# Patient Record
Sex: FEMALE | Race: WHITE | NOT HISPANIC OR LATINO | URBAN - METROPOLITAN AREA
[De-identification: names, ages, dates, MRNs, and addresses within clinical notes are randomized per-mention and may not be internally consistent; named-entity substitution may affect disease eponyms.]

---

## 2023-04-19 ENCOUNTER — OFFICE VISIT (OUTPATIENT)
Dept: URBAN - METROPOLITAN AREA CLINIC 17 | Facility: CLINIC | Age: 73
End: 2023-04-19
Payer: COMMERCIAL

## 2023-04-19 DIAGNOSIS — H40.033 ANATOMICAL NARROW ANGLE, BILATERAL: Primary | ICD-10-CM

## 2023-04-19 DIAGNOSIS — H35.722 SEROUS DETACHMENT OF RETINAL PIGMENT EPITHELIUM, LEFT EYE: ICD-10-CM

## 2023-04-19 DIAGNOSIS — H25.13 AGE-RELATED NUCLEAR CATARACT, BILATERAL: ICD-10-CM

## 2023-04-19 PROCEDURE — 99204 OFFICE O/P NEW MOD 45 MIN: CPT | Performed by: OPTOMETRIST

## 2023-04-19 PROCEDURE — 92134 CPTRZ OPH DX IMG PST SGM RTA: CPT | Performed by: OPTOMETRIST

## 2023-04-19 ASSESSMENT — INTRAOCULAR PRESSURE
OD: 19
OS: 15

## 2023-04-19 NOTE — IMPRESSION/PLAN
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 Plan: Ordered and reviewed MAC OCT w/ patient. Discussed diagnosis in detail with patient. Advised patient of condition. No treatment is required at this time. Will continue to observe condition and or symptoms. Advised patient to call with any decreases in vision.

## 2023-04-19 NOTE — IMPRESSION/PLAN
Impression: Anatomical narrow angle, bilateral: H40.033. Plan: Discussed diagnosis and treatment options in detail with patient. Continue Latanoprost QHS OS only. Will refer to Dr. Shravan Ross for [address]. Pt wishes to proceed.

## 2023-09-13 ENCOUNTER — OFFICE VISIT (OUTPATIENT)
Dept: URBAN - METROPOLITAN AREA CLINIC 17 | Facility: CLINIC | Age: 73
End: 2023-09-13
Payer: COMMERCIAL

## 2023-09-13 DIAGNOSIS — H40.033 ANATOMICAL NARROW ANGLE, BILATERAL: Primary | ICD-10-CM

## 2023-09-13 DIAGNOSIS — H25.13 AGE-RELATED NUCLEAR CATARACT, BILATERAL: ICD-10-CM

## 2023-09-13 PROCEDURE — 92134 CPTRZ OPH DX IMG PST SGM RTA: CPT | Performed by: OPHTHALMOLOGY

## 2023-09-13 PROCEDURE — 99204 OFFICE O/P NEW MOD 45 MIN: CPT | Performed by: OPHTHALMOLOGY

## 2023-09-13 PROCEDURE — 92020 GONIOSCOPY: CPT | Performed by: OPHTHALMOLOGY

## 2023-09-13 PROCEDURE — 92133 CPTRZD OPH DX IMG PST SGM ON: CPT | Performed by: OPHTHALMOLOGY

## 2023-09-13 RX ORDER — PREDNISOLONE ACETATE 10 MG/ML
1 % SUSPENSION/ DROPS OPHTHALMIC
Qty: 5 | Refills: 0 | Status: ACTIVE
Start: 2023-09-13

## 2023-09-13 RX ORDER — PREDNISOLONE ACETATE 10 MG/ML
1 % SUSPENSION/ DROPS OPHTHALMIC
Qty: 5 | Refills: 0 | Status: INACTIVE
Start: 2023-09-13 | End: 2023-09-13

## 2023-09-13 ASSESSMENT — INTRAOCULAR PRESSURE
OS: 17
OD: 19

## 2023-10-02 ENCOUNTER — SURGERY (OUTPATIENT)
Dept: URBAN - METROPOLITAN AREA SURGERY 7 | Facility: SURGERY | Age: 73
End: 2023-10-02
Payer: COMMERCIAL

## 2023-10-09 ENCOUNTER — POST-OPERATIVE VISIT (OUTPATIENT)
Dept: URBAN - METROPOLITAN AREA CLINIC 17 | Facility: CLINIC | Age: 73
End: 2023-10-09
Payer: COMMERCIAL

## 2023-10-09 DIAGNOSIS — Z48.810 ENCOUNTER FOR SURGICAL AFTERCARE FOLLOWING SURGERY ON A SENSE ORGAN: Primary | ICD-10-CM

## 2023-10-09 PROCEDURE — 99024 POSTOP FOLLOW-UP VISIT: CPT | Performed by: OPTOMETRIST

## 2023-10-09 ASSESSMENT — INTRAOCULAR PRESSURE
OD: 20
OS: 21

## 2024-04-09 ENCOUNTER — OFFICE VISIT (OUTPATIENT)
Dept: URBAN - METROPOLITAN AREA CLINIC 17 | Facility: CLINIC | Age: 74
End: 2024-04-09
Payer: COMMERCIAL

## 2024-04-09 DIAGNOSIS — H25.13 AGE-RELATED NUCLEAR CATARACT, BILATERAL: Primary | ICD-10-CM

## 2024-04-09 DIAGNOSIS — H31.012 MACULA SCAR OF POST POLE OF LEFT EYE: ICD-10-CM

## 2024-04-09 DIAGNOSIS — H40.052 OCULAR HYPERTENSION, LEFT EYE: ICD-10-CM

## 2024-04-09 PROCEDURE — 92133 CPTRZD OPH DX IMG PST SGM ON: CPT | Performed by: OPTOMETRIST

## 2024-04-09 PROCEDURE — 99214 OFFICE O/P EST MOD 30 MIN: CPT | Performed by: OPTOMETRIST

## 2024-04-09 PROCEDURE — 92134 CPTRZ OPH DX IMG PST SGM RTA: CPT | Performed by: OPTOMETRIST

## 2024-04-09 RX ORDER — LATANOPROST 50 UG/ML
0.005 % SOLUTION OPHTHALMIC
Qty: 7.5 | Refills: 1 | Status: ACTIVE
Start: 2024-04-09

## 2024-04-09 ASSESSMENT — INTRAOCULAR PRESSURE
OD: 19
OS: 16

## 2024-04-16 ENCOUNTER — OFFICE VISIT (OUTPATIENT)
Dept: URBAN - METROPOLITAN AREA CLINIC 17 | Facility: CLINIC | Age: 74
End: 2024-04-16
Payer: COMMERCIAL

## 2024-04-16 DIAGNOSIS — H52.03 HYPERMETROPIA, BILATERAL: Primary | ICD-10-CM

## 2024-04-16 PROCEDURE — 92012 INTRM OPH EXAM EST PATIENT: CPT | Performed by: OPTOMETRIST

## 2024-04-16 ASSESSMENT — INTRAOCULAR PRESSURE
OS: 22
OD: 26

## 2024-04-16 ASSESSMENT — VISUAL ACUITY
OD: 20/25
OS: 20/30

## 2024-10-16 ENCOUNTER — OFFICE VISIT (OUTPATIENT)
Dept: URBAN - METROPOLITAN AREA CLINIC 17 | Facility: CLINIC | Age: 74
End: 2024-10-16
Payer: COMMERCIAL

## 2024-10-16 DIAGNOSIS — H40.052 OCULAR HYPERTENSION, LEFT EYE: Primary | ICD-10-CM

## 2024-10-16 PROCEDURE — 99213 OFFICE O/P EST LOW 20 MIN: CPT | Performed by: OPTOMETRIST

## 2024-10-16 RX ORDER — LATANOPROST 50 UG/ML
0.005 % SOLUTION OPHTHALMIC
Qty: 7.5 | Refills: 1 | Status: ACTIVE
Start: 2024-10-16

## 2024-10-16 ASSESSMENT — INTRAOCULAR PRESSURE
OS: 19
OD: 16

## 2025-04-21 ENCOUNTER — OFFICE VISIT (OUTPATIENT)
Dept: URBAN - METROPOLITAN AREA CLINIC 17 | Facility: CLINIC | Age: 75
End: 2025-04-21
Payer: COMMERCIAL

## 2025-04-21 DIAGNOSIS — H31.012 MACULA SCARS OF POSTERIOR POLE (POSTINFLAMMATORY) (POST-TRAUMATIC), LEFT EYE: ICD-10-CM

## 2025-04-21 DIAGNOSIS — H25.13 AGE-RELATED NUCLEAR CATARACT, BILATERAL: ICD-10-CM

## 2025-04-21 DIAGNOSIS — H40.052 OCULAR HYPERTENSION, LEFT EYE: Primary | ICD-10-CM

## 2025-04-21 DIAGNOSIS — H11.153 PINGUECULA, BILATERAL: ICD-10-CM

## 2025-04-21 DIAGNOSIS — H40.033 ANATOMICAL NARROW ANGLE, BILATERAL: ICD-10-CM

## 2025-04-21 PROCEDURE — 92133 CPTRZD OPH DX IMG PST SGM ON: CPT | Performed by: OPTOMETRIST

## 2025-04-21 PROCEDURE — 92083 EXTENDED VISUAL FIELD XM: CPT | Performed by: OPTOMETRIST

## 2025-04-21 PROCEDURE — 92134 CPTRZ OPH DX IMG PST SGM RTA: CPT | Performed by: OPTOMETRIST

## 2025-04-21 PROCEDURE — 92014 COMPRE OPH EXAM EST PT 1/>: CPT | Performed by: OPTOMETRIST

## 2025-04-21 RX ORDER — LATANOPROST 50 UG/ML
0.005 % SOLUTION OPHTHALMIC
Qty: 7.5 | Refills: 1 | Status: ACTIVE
Start: 2025-04-21

## 2025-04-21 ASSESSMENT — INTRAOCULAR PRESSURE
OS: 14
OD: 21